# Patient Record
(demographics unavailable — no encounter records)

---

## 2025-04-10 NOTE — PLAN
[FreeTextEntry1] : Will check routine labs  pt request referral to neuro along with MRI head  pt declined mammogram and breast US  sent for thyroid US  pt planned to see GI for colonoscopy

## 2025-04-10 NOTE — PHYSICAL EXAM
[No JVD] : no jugular venous distention [No Lymphadenopathy] : no lymphadenopathy [Supple] : supple [No Respiratory Distress] : no respiratory distress  [No Accessory Muscle Use] : no accessory muscle use [Clear to Auscultation] : lungs were clear to auscultation bilaterally [Normal Rate] : normal rate  [Regular Rhythm] : with a regular rhythm [Normal S1, S2] : normal S1 and S2 [No Murmur] : no murmur heard [No Extremity Clubbing/Cyanosis] : no extremity clubbing/cyanosis [Normal Gait] : normal gait [Alert and Oriented x3] : oriented to person, place, and time

## 2025-04-10 NOTE — HISTORY OF PRESENT ILLNESS
[FreeTextEntry1] : patient presents for establish care and discuss menopause and weight loss medication  [de-identified] : 54F is here new patient. PMHx chronic migraines and seasonal allergies. She has concern with dementia since her mother currently suffers from Alzheimer's. She also reports weight gain.  Denies memory changes.

## 2025-04-10 NOTE — HEALTH RISK ASSESSMENT
[Patient declined mammogram] : Patient declined mammogram [Patient reported PAP Smear was normal] : Patient reported PAP Smear was normal [Never] : Never [0] : 2) Feeling down, depressed, or hopeless: Not at all (0) [PHQ-2 Negative - No further assessment needed] : PHQ-2 Negative - No further assessment needed [FreeTextEntry1] : refer to HPI  [de-identified] : GYN  [XBO1Kycul] : 0 [ColonoscopyComments] : never had one

## 2025-04-10 NOTE — HEALTH RISK ASSESSMENT
[Patient declined mammogram] : Patient declined mammogram [Patient reported PAP Smear was normal] : Patient reported PAP Smear was normal [Never] : Never [0] : 2) Feeling down, depressed, or hopeless: Not at all (0) [PHQ-2 Negative - No further assessment needed] : PHQ-2 Negative - No further assessment needed [FreeTextEntry1] : refer to HPI  [de-identified] : GYN  [[id number]] : 0 [ColonoscopyComments] : never had one

## 2025-04-10 NOTE — HISTORY OF PRESENT ILLNESS
[FreeTextEntry1] : patient presents for establish care and discuss menopause and weight loss medication  [de-identified] : 54F is here new patient. PMHx chronic migraines and seasonal allergies. She has concern with dementia since her mother currently suffers from Alzheimer's. She also reports weight gain.  Denies memory changes.

## 2025-04-10 NOTE — REVIEW OF SYSTEMS
[Fatigue] : fatigue [Recent Change In Weight] : ~T recent weight change [Negative] : Heme/Lymph [Fever] : no fever [Night Sweats] : no night sweats

## 2025-04-25 NOTE — HISTORY OF PRESENT ILLNESS
[Home] : at home, [unfilled] , at the time of the visit. [Medical Office: ([facility])___] : at the medical office located in  [Telehealth (audio & video)] : This visit was provided via telehealth using real-time 2-way audio visual technology. [Verbal consent obtained from patient] : the patient, [unfilled] [FreeTextEntry1] : weight loss  [de-identified] : Verbal consent was obtained from patient for telehealth on 4/25/25  States she will eat chips and have ginger ale at night she snacks.

## 2025-04-25 NOTE — PLAN
[FreeTextEntry1] : Will start Topamax for weight loss  1 month with medication tolerance and weight check

## 2025-05-29 NOTE — PLAN
[TextEntry] : -- Post rubber band ligation instructions given -- Discussed with patient that she should begin a powder fiber supplement (Metamucil, Benefiber, psyllium, etc).  She was also advised to drink at least 8 glasses of water daily in addition. -- Miralax and stool softener may be added as neededlreg -- Patient's history is also consistent with an element of pruritus ani, given her intense cleaning measures.  Advised to avoid any soaps/wipes to area and to clean with water only. -- Suppository sent - start on Saturday -- Follow up in 3-4 weeks for repeat evaluation, possible repeat banding.  She is also potentially interested in eventual removal of her external tags - would be addressed after resolution of internal disease vs with formal hemorrhoidectomy if in-office procedures are unsuccessful at alleviating her symptoms.

## 2025-05-29 NOTE — PHYSICAL EXAM
[JVD] : no jugular venous distention  [Respiratory Effort] : normal respiratory effort [Normal Rate and Rhythm] : normal rate and rhythm [No Edema] : No edema [No Rash or Lesion] : No rash or lesion [Alert] : alert [Oriented to Person] : oriented to person [Oriented to Place] : oriented to place [Oriented to Time] : oriented to time [Calm] : calm [de-identified] : Soft, nondistended [de-identified] : External exam - R anterior and L lateral moderately sized skin tags, no fissure, fistula, thromboses, condyloma, excoriations.  FILIBERTO without masses/tenderness.  Anoscopy with grade III bleeding R posterior internal hemorrhoid, banded as below.  grade II inflamed LL and RA hemorrhoids. [de-identified] : NAD [de-identified] : Normocephalic [de-identified] : Moves all extremities

## 2025-05-29 NOTE — HISTORY OF PRESENT ILLNESS
[FreeTextEntry1] : 54yoF who presents with chief complaint of perianal discomfort, prolapse type sensation, external lump, and relatively painless rectal bleeding that occurs when she has more than one bowel movement in a day.  She recently started fiber gummies which has helped her bowel movements slightly.  She describes straining at stool for 15-20 minutes at a time and also that she cleans with several wipes and showering/soap after BMs.  She had a colonoscopy earlier this year which showed a tubular adenoma and hemorrhoids, and was advised to follow up in 5 years.  No family history of CRC/polyps/IBD otherwise.  She is on medications for migraines but is otherwise healthy and has had no surgeries.

## 2025-05-29 NOTE — PROCEDURE
[FreeTextEntry1] : After informed consent was obtained, a lubricated lighted anoscope was inserted into the anal canal to evaluate the hemorrhoids.  The canal was inspected circumferentially up to the level above the dentate line.  The R posterior internal hemorrhoid was banded above the dentate line.  The scope was withdrawn. The patient tolerated the procedure well.

## 2025-06-06 NOTE — PLAN
[TextEntry] : -- Continue bowel regimen, external ointment for hemorrhoids -- Expectant management for thrombosis as overlying skin is normal and she is reaching the point where symptoms should start to improve spontaneously -- Can use Tylenol/ibuprofen, Sitz baths, and topical lidocaine (e.g. Recticare, other OTC preparations) for pain control  -- Follow up in 4 weeks for repeat evaluation, possible repeat banding

## 2025-06-06 NOTE — HISTORY OF PRESENT ILLNESS
[FreeTextEntry1] : 6/6/2025: Presents for follow-up.  She felt fairly well after her banding procedure at last visit a week ago, however five days ago she had episodes of diarrhea and subsequently noted perianal pain and external lump.  No fevers.  5/29/2025: 54yoF who presents with chief complaint of perianal discomfort, prolapse type sensation, external lump, and relatively painless rectal bleeding that occurs when she has more than one bowel movement in a day.  She recently started fiber gummies which has helped her bowel movements slightly.  She describes straining at stool for 15-20 minutes at a time and also that she cleans with several wipes and showering/soap after BMs.  She had a colonoscopy earlier this year which showed a tubular adenoma and hemorrhoids, and was advised to follow up in 5 years.  No family history of CRC/polyps/IBD otherwise.  She is on medications for migraines but is otherwise healthy and has had no surgeries.

## 2025-06-06 NOTE — PHYSICAL EXAM
[JVD] : no jugular venous distention  [Respiratory Effort] : normal respiratory effort [Normal Rate and Rhythm] : normal rate and rhythm [No Edema] : No edema [No Rash or Lesion] : No rash or lesion [Alert] : alert [Oriented to Person] : oriented to person [Oriented to Place] : oriented to place [Oriented to Time] : oriented to time [Calm] : calm [de-identified] : Soft, nondistended [de-identified] : External exam - A L anterior there is a thrombosed external hemorrhoid, tender, normal overlying skin.  R anterior skin tag, nontender.  Internal exam deferred for today. [de-identified] : NAD [de-identified] : Normocephalic [de-identified] : Moves all extremities